# Patient Record
Sex: FEMALE | Race: WHITE | URBAN - METROPOLITAN AREA
[De-identification: names, ages, dates, MRNs, and addresses within clinical notes are randomized per-mention and may not be internally consistent; named-entity substitution may affect disease eponyms.]

---

## 2018-05-31 ENCOUNTER — THERAPY VISIT (OUTPATIENT)
Dept: PHYSICAL THERAPY | Facility: CLINIC | Age: 58
End: 2018-05-31
Payer: COMMERCIAL

## 2018-05-31 DIAGNOSIS — M54.16 LUMBAR RADICULOPATHY: Primary | ICD-10-CM

## 2018-05-31 PROCEDURE — G8978 MOBILITY CURRENT STATUS: HCPCS | Mod: GP | Performed by: PHYSICAL THERAPIST

## 2018-05-31 PROCEDURE — 97161 PT EVAL LOW COMPLEX 20 MIN: CPT | Mod: GP | Performed by: PHYSICAL THERAPIST

## 2018-05-31 PROCEDURE — 97110 THERAPEUTIC EXERCISES: CPT | Mod: GP | Performed by: PHYSICAL THERAPIST

## 2018-05-31 PROCEDURE — G8979 MOBILITY GOAL STATUS: HCPCS | Mod: GP | Performed by: PHYSICAL THERAPIST

## 2018-05-31 NOTE — LETTER
[facility]  [address]  [facility] [id number] [phone]    May 31, 2018    Re: Jesica Chen   :   1960  MRN:  [id number]   REFERRING PHYSICIAN:   Triston Lane    [facility] ATHLETIC [facility]    Date of Initial Evaluation: 2018  Visits:  Rxs Used: 1  Reason for Referral:  Lumbar radiculopathy    EVALUATION SUMMARY    [facility] -- Lumbar    Date: May 31, 2018  Jesica Chen is a 57 year old female with a lumbar condition.   Referral: primary care  Work mechanical stresses:  sitting  Employment status:  Contract job sitting at computer  Leisure mechanical stresses: none  Functional disability score (MELANIE/STarT Back):  See MELANIE in flowsheet  VAS score (0-10): 4/10  Patient goals/expectations:  Learn how to relieve the pain    HISTORY:    Present symptoms: Low back pain, B buttock/thigh pain, L thigh numbness, back fatigue  Pain quality (sharp/shooting/stabbing/aching/burning/cramping):  Sharp, achey   Paresthesia (yes/no):  L thigh numbness    Present since (onset date): two years (saw MD for other reason in May 2018).     Symptoms (improving/unchanging/worsening):  worsening.     Symptoms commenced as a result of: nothing   Condition occurred in the following environment:   n/a     Symptoms at onset (back/thigh/leg): lower back  Constant symptoms (back/thigh/leg): Back and leg pain  Intermittent symptoms (back/thigh/leg): L thigh numbness    Symptoms are made worse with the following: Always Sitting and Time of day - No effect   Symptoms are made better with the following: Time of day - No effect and Other - hip stretches, rubbing the L thigh, on the move    Disturbed sleep (yes/no):  One in a while Sleeping postures (prone/sup/side R/L): R side    Previous episodes (0/1-5/6-10/11+): ongoing for two years Year of first episode: ~    Previous  "history: none  Previous treatments: chiropractor (temporary relief)    Specific Questions:  Cough/Sneeze/Strain (pos/neg): neg  Bowel/Bladder (normal/abnormal): normal  Gait (normal/abnormal): normal  Medications (nil/NSAIDS/analg/steroids/anticoag/other):  Other - Anti-depressants  Medical allergies:  none  General health (excellent/good/fair/poor):  excellent  Pertinent medical history:  Depression, Menopausal and Overweight  Imaging (None/Xray/MRI/Other):  MRI last week (told \"arthritis\")  Recent or major surgery (yes/no):  none  Night pain (yes/no): no  Accidents (yes/no): no  Unexplained weight loss (yes/no): no  Barriers at home: none  Other red flags: no    EXAMINATION    Posture:   Sitting (good/fair/poor): poor  Standing (good/fair/poor):fair  Lordosis (red/acc/normal): normal  Correction of posture (better/worse/no effect): slight inc     Lateral Shift (right/left/nil): nil  Relevant (yes/no):  n/a  Other Observations: none    Neurological:    Motor deficit:  none  Reflexes:  n/a  Sensory deficit:  L lateral thigh (L3) decreased sensation  Dural signs:      Movement Loss:   Kameron Mod Min Nil Pain   Flexion    x    Extension   x     Side Gliding R    x Inc L LB   Side Gliding L    x      Test Movements:   During: produces, abolishes, increases, decreases, no effect, centralizing, peripheralizing   After: better, worse, no better, no worse, no effect, centralized, peripheralized    Pretest symptoms standing:    Symptoms During Symptoms After ROM increased ROM decreased No Effect   FIS        Rep FIS        EIS        Rep EIS        Pretest symptoms lying: none    Symptoms During Symptoms After ROM increased ROM decreased No Effect   EVER        Rep EVER        EIL No Effect No Effect      Rep EIL Abolishes Better x     If required, pretest symptoms:    Symptoms During Symptoms After ROM increased ROM decreased No Effect   SGIS - R        Rep SGIS - R        SGIS - L        Rep SGIS - L          Static " Tests:  Sitting slouched:    Sitting erect:    Standing slouched   Standing erect:    Lying prone in extension:   Long sitting:      Other Tests: none    Provisional Classification:  Derangement - Bilateral, symmetrical, symptoms above knee    Principle of Management:  Education:  Posture support   Equipment provided:  none  Mechanical therapy (Y/N):  Y   Extension principle:  EIL with self overpressure 10-15 reps every 2-3 hrs  Lateral Principle:    Flexion principle:    Other:      ASSESSMENT/PLAN:    Patient is a 57 year old female with lumbar complaints.    Patient has the following significant findings with corresponding treatment plan.                Diagnosis 1:  B lumbar above knee derangement  Pain -  manual therapy, self management, education, directional preference exercise and home program  Decreased ROM/flexibility - manual therapy, therapeutic exercise, therapeutic activity and home program  Inflammation - self management/home program  Decreased function - therapeutic activities and home program  Impaired posture - neuro re-education, therapeutic activities and home program    Therapy Evaluation Codes:   1) History comprised of:   Personal factors that impact the plan of care:      None.    Comorbidity factors that impact the plan of care are:      None.     Medications impacting care: Anti-depressant.  2) Examination of Body Systems comprised of:   Body structures and functions that impact the plan of care:      Lumbar spine.   Activity limitations that impact the plan of care are:      Sitting.  3) Clinical presentation characteristics are:   Evolving/Changing.  4) Decision-Making    Low complexity using standardized patient assessment instrument and/or measureable assessment of functional outcome.  Cumulative Therapy Evaluation is: Low complexity.    Previous and current functional limitations:  (See Goal Flow Sheet for this information)    Short term and Long term goals: (See Goal Flow Sheet for  this information)     Communication ability:  Patient appears to be able to clearly communicate and understand verbal and written communication and follow directions correctly.  Treatment Explanation - The following has been discussed with the patient:   RX ordered/plan of care  Anticipated outcomes  Possible risks and side effects  This patient would benefit from PT intervention to resume normal activities.   Rehab potential is excellent.    Frequency:  1 X week, once daily  Duration:  for 6 weeks  Discharge Plan:  Achieve all LTG.  Independent in home treatment program.  Reach maximal therapeutic benefit.                                         Musculoskeletal:        Legs:        Thank you for your referral.    INQUIRIES  Therapist: Mario Lema DPT  [facility] PHYSICAL THERAPY  [address] [place] [address]. [address]  [facility] [address]  Phone: [phone]  Fax: [phone]

## 2018-05-31 NOTE — PROGRESS NOTES
HPI                    System    Physical Exam                                         Musculoskeletal:        Legs:      ROS

## 2018-05-31 NOTE — MR AVS SNAPSHOT
After Visit Summary   5/31/2018    Jesica Chen    MRN: [id number]           Patient Information     Date Of Birth          1960        Visit Information        Provider Department      5/31/2018 11:30 AM Mario Lema PT [facility] Physical Therapy        Today's Diagnoses     Lumbar radiculopathy    -  1       Follow-ups after your visit        Your next 10 appointments already scheduled     Jun 05, 2018 10:30 AM CDT   [facility] Spine with Mario Lema PT   [facility] Physical Therapy ([facility])    [address]  [facility] [address] [phone]              Who to contact     If you have questions or need follow up information about today's clinic visit or your schedule please contact [facility] ATHLETIC [facility] PHYSICAL THERAPY directly at [phone].  Normal or non-critical lab and imaging results will be communicated to you by MyChart, letter or phone within 4 business days after the clinic has received the results. If you do not hear from us within 7 days, please contact the clinic through MyChart or phone. If you have a critical or abnormal lab result, we will notify you by phone as soon as possible.  Submit refill requests through Gemidis or call your pharmacy and they will forward the refill request to us. Please allow 3 business days for your refill to be completed.          Additional Information About Your Visit        Care EveryWhere ID     This is your Care EveryWhere ID. This could be used by other organizations to access your [place] medical records  [id number]         Blood Pressure from Last 3 Encounters:   No data found for BP    Weight from Last 3 Encounters:   No data found for Wt              We Performed the Following     HC PT EVAL, LOW COMPLEXITY     SKY INITIAL EVAL REPORT     THERAPEUTIC EXERCISES        Primary Care Provider    None Specified        No primary provider on file.        Equal Access to Services     RUBY MENDIETA : Hadii aad ku hadjonimagen Lilly, daniella yañez, mathieu camilo. So [facility] [phone].    ATENCIÓN: Si habla español, tiene a mckeon disposición servicios gratuitos de asistencia lingüística. Llame al [phone].    We comply with applicable federal civil rights laws and [place] laws. We do not discriminate on the basis of race, color, national origin, age, disability, sex, sexual orientation, or gender identity.            Thank you!     Thank you for choosing [place] [facility] PHYSICAL THERAPY  for your care. Our goal is always to provide you with excellent care. Hearing back from our patients is one way we can continue to improve our services. Please take a few minutes to complete the written survey that you may receive in the mail after your visit with us. Thank you!             Your Updated Medication List - Protect others around you: Learn how to safely use, store and throw away your medicines at www.disposemymeds.org.      Notice  As of 5/31/2018  2:00 PM    You have not been prescribed any medications.

## 2018-05-31 NOTE — PROGRESS NOTES
[facility] Initial Evaluation -- Lumbar    Date: May 31, 2018  Jesica Chen is a 57 year old female with a lumbar condition.   Referral: primary care  Work mechanical stresses:  sitting  Employment status:  Contract job sitting at computer  Leisure mechanical stresses: none  Functional disability score (MELANIE/STarT Back):  See MELANIE in flowsheet  VAS score (0-10): 4/10  Patient goals/expectations:  Learn how to relieve the pain    HISTORY:    Present symptoms: Low back pain, B buttock/thigh pain, L thigh numbness, back fatigue  Pain quality (sharp/shooting/stabbing/aching/burning/cramping):  Sharp, achey   Paresthesia (yes/no):  L thigh numbness    Present since (onset date): two years (saw MD for other reason in May 2018).     Symptoms (improving/unchanging/worsening):  worsening.     Symptoms commenced as a result of: nothing   Condition occurred in the following environment:   n/a     Symptoms at onset (back/thigh/leg): lower back  Constant symptoms (back/thigh/leg): Back and leg pain  Intermittent symptoms (back/thigh/leg): L thigh numbness    Symptoms are made worse with the following: Always Sitting and Time of day - No effect   Symptoms are made better with the following: Time of day - No effect and Other - hip stretches, rubbing the L thigh, on the move    Disturbed sleep (yes/no):  One in a while Sleeping postures (prone/sup/side R/L): R side    Previous episodes (0/1-5/6-10/11+): ongoing for two years Year of first episode: ~2016    Previous history: none  Previous treatments: chiropractor (temporary relief)      Specific Questions:  Cough/Sneeze/Strain (pos/neg): neg  Bowel/Bladder (normal/abnormal): normal  Gait (normal/abnormal): normal  Medications (nil/NSAIDS/analg/steroids/anticoag/other):  Other - Anti-depressants  Medical allergies:  none  General health (excellent/good/fair/poor):  excellent  Pertinent medical history:  Depression, Menopausal and Overweight  Imaging  "(None/Xray/MRI/Other):  MRI last week (told \"arthritis\")  Recent or major surgery (yes/no):  none  Night pain (yes/no): no  Accidents (yes/no): no  Unexplained weight loss (yes/no): no  Barriers at home: none  Other red flags: no    EXAMINATION    Posture:   Sitting (good/fair/poor): poor  Standing (good/fair/poor):fair  Lordosis (red/acc/normal): normal  Correction of posture (better/worse/no effect): slight inc     Lateral Shift (right/left/nil): nil  Relevant (yes/no):  n/a  Other Observations: none    Neurological:    Motor deficit:  none  Reflexes:  n/a  Sensory deficit:  L lateral thigh (L3) decreased sensation  Dural signs:      Movement Loss:   Kameron Mod Min Nil Pain   Flexion    x    Extension   x     Side Gliding R    x Inc L LB   Side Gliding L    x      Test Movements:   During: produces, abolishes, increases, decreases, no effect, centralizing, peripheralizing   After: better, worse, no better, no worse, no effect, centralized, peripheralized    Pretest symptoms standing:    Symptoms During Symptoms After ROM increased ROM decreased No Effect   FIS        Rep FIS        EIS        Rep EIS        Pretest symptoms lying: none    Symptoms During Symptoms After ROM increased ROM decreased No Effect   EVER        Rep EVER        EIL No Effect No Effect      Rep EIL Abolishes Better x     If required, pretest symptoms:    Symptoms During Symptoms After ROM increased ROM decreased No Effect   SGIS - R        Rep SGIS - R        SGIS - L        Rep SGIS - L          Static Tests:  Sitting slouched:    Sitting erect:    Standing slouched   Standing erect:    Lying prone in extension:   Long sitting:      Other Tests: none    Provisional Classification:  Derangement - Bilateral, symmetrical, symptoms above knee    Principle of Management:  Education:  Posture support   Equipment provided:  none  Mechanical therapy (Y/N):  Y   Extension principle:  EIL with self overpressure 10-15 reps every 2-3 hrs  Lateral " Principle:    Flexion principle:    Other:      ASSESSMENT/PLAN:    Patient is a 57 year old female with lumbar complaints.    Patient has the following significant findings with corresponding treatment plan.                Diagnosis 1:  B lumbar above knee derangement  Pain -  manual therapy, self management, education, directional preference exercise and home program  Decreased ROM/flexibility - manual therapy, therapeutic exercise, therapeutic activity and home program  Inflammation - self management/home program  Decreased function - therapeutic activities and home program  Impaired posture - neuro re-education, therapeutic activities and home program    Therapy Evaluation Codes:   1) History comprised of:   Personal factors that impact the plan of care:      None.    Comorbidity factors that impact the plan of care are:      None.     Medications impacting care: Anti-depressant.  2) Examination of Body Systems comprised of:   Body structures and functions that impact the plan of care:      Lumbar spine.   Activity limitations that impact the plan of care are:      Sitting.  3) Clinical presentation characteristics are:   Evolving/Changing.  4) Decision-Making    Low complexity using standardized patient assessment instrument and/or measureable assessment of functional outcome.  Cumulative Therapy Evaluation is: Low complexity.    Previous and current functional limitations:  (See Goal Flow Sheet for this information)    Short term and Long term goals: (See Goal Flow Sheet for this information)     Communication ability:  Patient appears to be able to clearly communicate and understand verbal and written communication and follow directions correctly.  Treatment Explanation - The following has been discussed with the patient:   RX ordered/plan of care  Anticipated outcomes  Possible risks and side effects  This patient would benefit from PT intervention to resume normal activities.   Rehab potential is  excellent.    Frequency:  1 X week, once daily  Duration:  for 6 weeks  Discharge Plan:  Achieve all LTG.  Independent in home treatment program.  Reach maximal therapeutic benefit.    Please refer to the daily flowsheet for treatment today, total treatment time and time spent performing 1:1 timed codes.

## 2018-06-05 ENCOUNTER — THERAPY VISIT (OUTPATIENT)
Dept: PHYSICAL THERAPY | Facility: CLINIC | Age: 58
End: 2018-06-05
Payer: COMMERCIAL

## 2018-06-05 DIAGNOSIS — M54.16 LUMBAR RADICULOPATHY: ICD-10-CM

## 2018-06-05 PROCEDURE — 97530 THERAPEUTIC ACTIVITIES: CPT | Mod: GP | Performed by: PHYSICAL THERAPIST

## 2018-06-05 PROCEDURE — 97110 THERAPEUTIC EXERCISES: CPT | Mod: GP | Performed by: PHYSICAL THERAPIST

## 2018-06-05 NOTE — MR AVS SNAPSHOT
After Visit Summary   6/5/2018    Jesica Chen    MRN: [id number]           Patient Information     Date Of Birth          1960        Visit Information        Provider Department      6/5/2018 10:30 AM Mario Lema PT [facility] Physical Therapy        Today's Diagnoses     Lumbar radiculopathy           Follow-ups after your visit        Your next 10 appointments already scheduled     Jun 12, 2018  3:20 PM CDT   [facility] Spine with Mario Lema PT   [facility]    [address]  [facility] [address] [phone]              Who to contact     If you have questions or need follow up information about today's clinic visit or your schedule please contact [facility] ATHLETIC [facility] PHYSICAL THERAPY directly at [phone].  Normal or non-critical lab and imaging results will be communicated to you by MyChart, letter or phone within 4 business days after the clinic has received the results. If you do not hear from us within 7 days, please contact the clinic through MyChart or phone. If you have a critical or abnormal lab result, we will notify you by phone as soon as possible.  Submit refill requests through Inbenta or call your pharmacy and they will forward the refill request to us. Please allow 3 business days for your refill to be completed.          Additional Information About Your Visit        Care EveryWhere ID     This is your Care EveryWhere ID. This could be used by other organizations to access your [place] medical records  [id number]         Blood Pressure from Last 3 Encounters:   No data found for BP    Weight from Last 3 Encounters:   No data found for Wt              We Performed the Following     THERAPEUTIC ACTIVITIES     THERAPEUTIC EXERCISES        Primary Care Provider Fax #    Physician No Ref-Primary [phone]       No  address on file        Equal Access to Services     JEREMIAS ANAM : Hadii aad ku haddeep Millerali, keaganmiguelito dickersondarienha, perezstevenson foxdorethamathieu rockwellgraceshantanu hoang. So [facility] [place] [facility] [phone].    ATENCIÓN: Si habla español, tiene a mckeon disposición servicios gratuitos de asistencia lingüística. Llame al [phone].    We comply with applicable federal civil rights laws and [place] laws. We do not discriminate on the basis of race, color, national origin, age, disability, sex, sexual orientation, or gender identity.            Thank you!     Thank you for choosing [place] [facility] PHYSICAL THERAPY  for your care. Our goal is always to provide you with excellent care. Hearing back from our patients is one way we can continue to improve our services. Please take a few minutes to complete the written survey that you may receive in the mail after your visit with us. Thank you!             Your Updated Medication List - Protect others around you: Learn how to safely use, store and throw away your medicines at www.disposemymeds.org.      Notice  As of 6/5/2018 11:07 AM    You have not been prescribed any medications.

## 2018-06-05 NOTE — PROGRESS NOTES
Subjective:  HPI                    Objective:  System    Physical Exam    General     ROS    Assessment/Plan:    SUBJECTIVE  Subjective changes as noted by pt:  Performing the pressups about 4x/day. As a result, reports she is slightly better. The back feels a little better but L thigh is almost more numb. Today reports L lateral thigh numbness, some pain across the back and into the L thigh.      Current pain level: 2/10     Changes in function:  Yes (See Goal flowsheet attached for changes in current functional level)     Adverse reaction to treatment or activity:  None    OBJECTIVE  Changes in objective findings:  Yes, See physical exam section and/or daily flowsheet for response to repeated movements.           ASSESSMENT  Jesica continues to require intervention to meet STG and LTG's: PT  Patient is progressing as expected.  Response to therapy has shown lack of progress in  radicular symptoms  Progress made towards STG/LTG?  None    PLAN  Continue current treatment plan until patient demonstrates readiness to progress to higher level exercises.    PTA/ATC plan:  N/A    Please refer to the daily flowsheet for treatment today, total treatment time and time spent performing 1:1 timed codes.

## 2018-06-12 ENCOUNTER — THERAPY VISIT (OUTPATIENT)
Dept: PHYSICAL THERAPY | Facility: CLINIC | Age: 58
End: 2018-06-12
Payer: COMMERCIAL

## 2018-06-12 DIAGNOSIS — M54.16 LUMBAR RADICULOPATHY: ICD-10-CM

## 2018-06-12 PROCEDURE — 97530 THERAPEUTIC ACTIVITIES: CPT | Mod: GP | Performed by: PHYSICAL THERAPIST

## 2018-06-12 PROCEDURE — 97110 THERAPEUTIC EXERCISES: CPT | Mod: GP | Performed by: PHYSICAL THERAPIST

## 2018-06-12 NOTE — MR AVS SNAPSHOT
After Visit Summary   6/12/2018    Jesica Chen    MRN: [id number]           Patient Information     Date Of Birth          1960        Visit Information        Provider Department      6/12/2018 3:20 PM [facility]        Today's Diagnoses     Lumbar radiculopathy           Follow-ups after your visit        Who to contact     If you have questions or need follow up information about today's clinic visit or your schedule please contact [facility] directly at [phone].  Normal or non-critical lab and imaging results will be communicated to you by MyChart, letter or phone within 4 business days after the clinic has received the results. If you do not hear from us within 7 days, please contact the clinic through MyChart or phone. If you have a critical or abnormal lab result, we will notify you by phone as soon as possible.  Submit refill requests through iHydroRun or call your pharmacy and they will forward the refill request to us. Please allow 3 business days for your refill to be completed.          Additional Information About Your Visit        Care EveryWhere ID     This is your Care EveryWhere ID. This could be used by other organizations to access your [place] medical records  [id number]         Blood Pressure from Last 3 Encounters:   No data found for BP    Weight from Last 3 Encounters:   No data found for Wt              We Performed the Following     THERAPEUTIC ACTIVITIES     THERAPEUTIC EXERCISES        Primary Care Provider Fax #    Physician No Ref-Primary [phone]       No address on file        Equal Access to Services     JEREMIAS MENDIETA : Hadii kyle Lilly, waleonelda pari, qaybta kaalmamathieu rockwell . So [facility] [phone].    ATENCIÓN: Si habla español, tiene a mckeon disposición servicios  dalia de asistencia lingüística. Kelly cummings [phone].    We comply with applicable federal civil rights laws and [place] laws. We do not discriminate on the basis of race, color, national origin, age, disability, sex, sexual orientation, or gender identity.            Thank you!     Thank you for choosing [place] [facility] PHYSICAL [facility]  for your care. Our goal is always to provide you with excellent care. Hearing back from our patients is one way we can continue to improve our services. Please take a few minutes to complete the written survey that you may receive in the mail after your visit with us. Thank you!             Your Updated Medication List - Protect others around you: Learn how to safely use, store and throw away your medicines at www.disposemymeds.org.      Notice  As of 6/12/2018  3:54 PM    You have not been prescribed any medications.

## 2018-06-12 NOTE — PROGRESS NOTES
Subjective:  HPI                    Objective:  System    Physical Exam    General     ROS    Assessment/Plan:    SUBJECTIVE  Subjective changes as noted by pt:  Continues the pressups usually about 5-6x/day on average. As a result, the back doesn't hurt as much/feels better. Less tight. The L thigh numbness is decreased.      Current pain level: 1/10     Changes in function:  Yes (See Goal flowsheet attached for changes in current functional level)     Adverse reaction to treatment or activity:  None    OBJECTIVE  Changes in objective findings:  Yes, See physical exam section and/or daily flowsheet for response to repeated movements.           ASSESSMENT  Jesica continues to require intervention to meet STG and LTG's: PT  Patient is progressing as expected.  Response to therapy has shown an improvement in  pain level, radicular symptoms and function  Progress made towards STG/LTG?  Yes (See Goal flowsheet attached for updates on achievement of STG and LTG)    PLAN  Continue current treatment plan until patient demonstrates readiness to progress to higher level exercises.    PTA/ATC plan:  N/A    Please refer to the daily flowsheet for treatment today, total treatment time and time spent performing 1:1 timed codes.

## 2018-07-20 PROBLEM — M54.16 LUMBAR RADICULOPATHY: Status: RESOLVED | Noted: 2018-05-31 | Resolved: 2018-07-20
